# Patient Record
Sex: MALE | Race: ASIAN | NOT HISPANIC OR LATINO | URBAN - METROPOLITAN AREA
[De-identification: names, ages, dates, MRNs, and addresses within clinical notes are randomized per-mention and may not be internally consistent; named-entity substitution may affect disease eponyms.]

---

## 2020-02-08 ENCOUNTER — OFFICE VISIT - HEALTHEAST (OUTPATIENT)
Dept: FAMILY MEDICINE | Facility: CLINIC | Age: 65
End: 2020-02-08

## 2020-02-08 DIAGNOSIS — R05.9 COUGH: ICD-10-CM

## 2020-02-08 RX ORDER — DORZOLAMIDE HYDROCHLORIDE AND TIMOLOL MALEATE 20; 5 MG/ML; MG/ML
SOLUTION/ DROPS OPHTHALMIC
Status: SHIPPED | COMMUNITY
Start: 2019-12-17

## 2020-02-08 RX ORDER — AMLODIPINE BESYLATE 10 MG/1
TABLET ORAL
Status: SHIPPED | COMMUNITY
Start: 2019-11-23

## 2020-02-08 RX ORDER — LEVOTHYROXINE SODIUM 50 UG/1
50 TABLET ORAL
Status: SHIPPED | COMMUNITY
Start: 2019-06-20

## 2020-02-08 RX ORDER — LATANOPROST 50 UG/ML
SOLUTION/ DROPS OPHTHALMIC
Status: SHIPPED | COMMUNITY
Start: 2020-01-05

## 2020-02-08 RX ORDER — ALBUTEROL SULFATE 90 UG/1
2 AEROSOL, METERED RESPIRATORY (INHALATION) EVERY 4 HOURS PRN
Qty: 1 EACH | Refills: 0 | Status: SHIPPED | OUTPATIENT
Start: 2020-02-08

## 2021-06-04 VITALS
RESPIRATION RATE: 14 BRPM | OXYGEN SATURATION: 93 % | HEART RATE: 86 BPM | TEMPERATURE: 98.8 F | DIASTOLIC BLOOD PRESSURE: 69 MMHG | WEIGHT: 125.3 LBS | SYSTOLIC BLOOD PRESSURE: 164 MMHG

## 2021-06-05 NOTE — PROGRESS NOTES
Walk In Care Note                                                        Date of Visit: 2/8/2020     Chief Complaint   Mary Oneil is a(n) 64 y.o.  male who presents to [facility] with the following complaint(s):  Cough (coughing that is worse at night, patient states heavy chest feelings, fatigued )       Assessment and Plan   1. Cough  - XR Chest 2 Views; Future  - XR Chest 2 Views  - albuterol (PROAIR HFA;PROVENTIL HFA;VENTOLIN HFA) 90 mcg/actuation inhaler; Inhale 2 puffs every 4 (four) hours as needed for wheezing or shortness of breath (or cough).  Dispense: 1 each; Refill: 0      Patient with end-stage kidney disease, on dialysis, and history of uremic pericarditis presenting with 1-day history of cough. Chest x-ray completed to rule out pneumonia and pulmonary edema. No additional symptoms to suggest pulmonary embolism. No indication for antibiotic therapy at this time. Refilled albuterol inhaler to be used as needed.     Counseled patient regarding assessment and plan for evaluation and treatment. Questions were answered. See AVS for the specific written instructions and educational handout(s) regarding viral bronchitis that were provided at the conclusion of the visit.     Discussed signs / symptoms that warrant urgent / emergent medical attention.     Follow up with Primary Care in 5 days if symptoms persist.      History of Present Illness   Primary symptom: Cough  Onset: Yesterday morning  Progression: Worsening  Description of cough: Wet  Sputum production: Clear phlegm at times  Hemoptysis: No  Shortness of breath: Yes  Chest wall pain: No  Upper respiratory symptoms: Has had rhinorrhea for several months.   Fevers: No  Chills: No  Edema: Has noted some dependent edema with prolonged sitting for the past couple of months.   Additional symptoms: None  Home therapies utilized: None  Underlying lung disease: Denies history of asthma, COPD, and DVT/PE. Has been prescribed an albuterol inhaler in the  past.   Additional pertinent history: Has end-stage kidney disease. Underwent renal transplant in the 1980s. Transplant failed after a year due to complications from immunosuppression. Has dialyzed since 1982. Dialyzes for 3 hours 3 days per week (Monday, Wednesday, and Friday) at [facility] in [place]. Last dialyzed yesterday. Dry weight is 55 kg.      Review of Systems   Review of Systems   All other systems reviewed and are negative.       Physical Exam   Vitals:    02/08/20 1356   BP: 164/69   Pulse: 86   Resp: 14   Temp: 98.8  F (37.1  C)   TempSrc: Oral   SpO2: 93%   Weight: 125 lb 4.8 oz (56.8 kg)     Physical Exam  Vitals signs and nursing note reviewed.   Constitutional:       General: He is not in acute distress.     Appearance: He is well-developed and normal weight. He is not ill-appearing, toxic-appearing or diaphoretic.   HENT:      Head: Normocephalic and atraumatic.      Right Ear: Tympanic membrane, ear canal and external ear normal.      Left Ear: Tympanic membrane, ear canal and external ear normal.      Nose: No mucosal edema or rhinorrhea.      Right Sinus: No maxillary sinus tenderness or frontal sinus tenderness.      Left Sinus: No maxillary sinus tenderness or frontal sinus tenderness.      Mouth/Throat:      Mouth: Mucous membranes are moist. No oral lesions.      Pharynx: Uvula midline. No oropharyngeal exudate or posterior oropharyngeal erythema.   Eyes:      General: Lids are normal.      Conjunctiva/sclera: Conjunctivae normal.   Neck:      Musculoskeletal: Neck supple. No edema or erythema.   Cardiovascular:      Rate and Rhythm: Normal rate and regular rhythm.      Heart sounds: S1 normal and S2 normal. No murmur. No friction rub. No gallop.       Arteriovenous access: right arteriovenous access is present.     Comments: Thrill from right arm AV access transmits to the chest.   Pulmonary:      Effort: Pulmonary effort is normal.      Breath sounds: Normal breath sounds. No stridor. No  wheezing, rhonchi or rales.   Musculoskeletal:      Right lower le+ Edema present.      Left lower le+ Edema present.   Lymphadenopathy:      Cervical: No cervical adenopathy.   Skin:     General: Skin is warm and dry.      Coloration: Skin is not pale.      Findings: No rash.   Neurological:      General: No focal deficit present.      Mental Status: He is alert and oriented to person, place, and time.          Diagnostic Studies   Laboratory:  N/A    Radiology:  EXAM DATE:         2020  EXAM: X-RAY CHEST, 2 VIEWS, FRONTAL AND LATERAL  LOCATION: [facility]  DATE/TIME: 2020 3:15 PM  INDICATION: Cough, dialysis patient, evaluate for pneumonia and pulmonary edema  COMPARISON: 2009  IMPRESSION: Heart size and pulmonary vascularity upper limits of normal and  unchanged. A few strands of fibrosis and/or linear atelectasis scattered  throughout both lungs stable. Lungs otherwise clear. No pleural effusion.  Calcified tortuous thoracic aorta. Bones appear demineralized.    Electrocardiogram:  N/A     Procedure Note   N/A     Pertinent History   The following portions of the patient's history were reviewed and updated as appropriate: allergies, current medications, past family history, past medical history, past social history, past surgical history and problem list.    Patient has Dialysis patient (H); ESRD (end stage renal disease) on dialysis (H); Glaucoma; and Hypertension on their problem list.    Patient has a past medical history of Abnormal EKG (08/15/2007), Blind (10/2002), Chronic renal failure, Dialysis patient (H) (2019), GI bleed (2002), Hiatal hernia, HTN (hypertension), Kaposi sarcoma (H) (), Legionella pneumonia (H) (), and Pericarditis secondary to uremia (2002).    Patient has a past surgical history that includes Splenectomy (); Exploratory laparotomy (1987); Small intestine surgery (); Nephrectomy transplanted organ ();  and DDRT (1985).    Patient's family history is not on file.    Patient reports that he has never smoked. He has never used smokeless tobacco.     Portions of this note have been dictated using voice recognition software. Any grammatical or contextual distortions are unintentional and inherent to the software.    Jonny Blanchard MD  [facility]

## 2021-06-16 PROBLEM — Z99.2 DIALYSIS PATIENT (H): Status: ACTIVE | Noted: 2019-06-13

## 2021-06-18 NOTE — PATIENT INSTRUCTIONS - HE
Patient Instructions by Jonny Blanchard MD at 2/8/2020 12:30 PM     Author: Jonny Blanchard MD Service: -- Author Type: Physician    Filed: 2/8/2020  4:10 PM Encounter Date: 2/8/2020 Status: Addendum    : Jonny Blanchard MD (Physician)    Related Notes: Original Note by Jonny Blanchard MD (Physician) filed at 2/8/2020  4:10 PM       -Your chest x-ray shows no signs of pneumonia or heart failure.  -No indication for an antibiotic at this time.  -May use over-the-counter guaifenesin/dextromethorphan (Robitussin-DM or Mucinex-DM) per package instructions.  -Refilled an albuterol inhaler to be used as needed for cough, wheezing, or shortness of breath.  -Follow-up with your Primary Care Provider within the next few days if your symptoms do not improve.  -Go to the nearest ER if your symptoms worsen significantly.  Patient Education     Viral Bronchitis (Adult)    You have a viral bronchitis. Bronchitis is inflammation and swelling of the lining of the lungs. This is often caused by an infection. Symptoms include a dry, hacking cough that is worse at night. The cough may bring up yellow-green mucus. You may also feel short of breath or wheeze. Other symptoms may include tiredness, chest discomfort, and chills.  Bronchitis that is caused by a virus is not treated with antibiotics. Instead, medicines may be given to help relieve symptoms. Symptoms can last up to 2 weeks, although the cough may last much longer.  This illness is contagious during the first few days and is spread through the air by coughing and sneezing, or by direct contact (touching the sick person and then touching your own eyes, nose, or mouth).  Most viral illnesses resolve within 10 to 14 days with rest and simple home remedies, although they may sometimes last for several weeks.  Home care    If symptoms are severe, rest at home for the first 2 to 3 days. When you go back to your usual activities, don't let yourself get too  tired.    Do not smoke. Also avoid being exposed to secondhand smoke.    You may use over-the-counter medicine to control fever or pain, unless another pain medicine was prescribed. If you have chronic liver or kidney disease or have ever had a stomach ulcer or gastrointestinal bleeding, talk with your healthcare provider before using these medicines. Also talk to your provider if you are taking medicine to prevent blood clots. Aspirin should never be given to anyone younger than 18 years of age who is ill with a viral infection or fever. It may cause severe liver or brain damage.    Your appetite may be poor, so a light diet is fine. Avoid dehydration by drinking 6 to 8 glasses of fluids per day (such as water, soft drinks, sports drinks, juices, tea, or soup). Extra fluids will help loosen secretions in the nose and lungs.    Over-the-counter cough, cold, and sore-throat medicines will not shorten the length of the illness, but they may help to reduce symptoms. Don't use decongestants if you have high blood pressure.  Follow-up care  Follow up with your healthcare provider, or as advised. If you had an X-ray or ECG (electrocardiogram), a specialist will review it. You will be notified of any new findings that may affect your care.  If you are age 65 or older, or if you have a chronic lung disease or condition that affects your immune system, or you smoke, ask your healthcare provider about getting a pneumococcal vaccine and a yearly flu shot (influenza vaccine).  When to seek medical advice  Call your healthcare provider right away if any of these occur:    Fever of 100.4 F (38 C) or higher, or as directed by your healthcare provider    Coughing up increased amounts of colored sputum    Weakness, drowsiness, headache, facial pain, ear pain, or a stiff neck  Call 911  Call 911 if any of these occur:    Coughing up blood    Worsening weakness, drowsiness, headache, or stiff neck    Trouble breathing, wheezing, or  pain with breathing  Date Last Reviewed: 9/13/2015 2000-2017 [employer] [address] [place] [address], [place] [address]. All rights reserved. This information is not intended as a substitute for professional medical care. Always follow your healthcare professional's instructions.